# Patient Record
Sex: MALE | Race: WHITE | Employment: FULL TIME | URBAN - METROPOLITAN AREA
[De-identification: names, ages, dates, MRNs, and addresses within clinical notes are randomized per-mention and may not be internally consistent; named-entity substitution may affect disease eponyms.]

---

## 2020-06-14 ENCOUNTER — HOSPITAL ENCOUNTER (EMERGENCY)
Age: 50
Discharge: HOME | End: 2020-06-14
Attending: EMERGENCY MEDICINE | Admitting: EMERGENCY MEDICINE
Payer: COMMERCIAL

## 2020-06-14 VITALS
HEIGHT: 74 IN | RESPIRATION RATE: 18 BRPM | WEIGHT: 255 LBS | OXYGEN SATURATION: 97 % | BODY MASS INDEX: 32.73 KG/M2 | DIASTOLIC BLOOD PRESSURE: 96 MMHG | TEMPERATURE: 98.4 F | HEART RATE: 72 BPM | SYSTOLIC BLOOD PRESSURE: 162 MMHG

## 2020-06-14 DIAGNOSIS — H60.501 ACUTE OTITIS EXTERNA OF RIGHT EAR, UNSPECIFIED TYPE: Primary | ICD-10-CM

## 2020-06-14 PROCEDURE — 74011000250 HC RX REV CODE- 250: Performed by: PHYSICIAN ASSISTANT

## 2020-06-14 PROCEDURE — 99283 EMERGENCY DEPT VISIT LOW MDM: CPT

## 2020-06-14 RX ORDER — CIPROFLOXACIN HYDROCHLORIDE 3.5 MG/ML
4 SOLUTION/ DROPS TOPICAL 2 TIMES DAILY
Qty: 5 ML | Refills: 0 | Status: SHIPPED | OUTPATIENT
Start: 2020-06-14 | End: 2020-06-21

## 2020-06-14 RX ORDER — PREDNISONE 50 MG/1
50 TABLET ORAL DAILY
Qty: 3 TAB | Refills: 0 | Status: SHIPPED | OUTPATIENT
Start: 2020-06-14 | End: 2020-06-17

## 2020-06-14 RX ORDER — CIPROFLOXACIN HYDROCHLORIDE 3.5 MG/ML
4 SOLUTION/ DROPS TOPICAL ONCE
Status: COMPLETED | OUTPATIENT
Start: 2020-06-14 | End: 2020-06-14

## 2020-06-14 RX ADMIN — CIPROFLOXACIN 4 DROP: 3 SOLUTION OPHTHALMIC at 13:20

## 2020-06-14 NOTE — ED TRIAGE NOTES
Patient reports right ear pain 2 weeks ago after wearing ear plugs. Patient reports pain subsided, but returned again 5 days ago.

## 2020-06-14 NOTE — ED PROVIDER NOTES
Date of Service:  6/14/2020    Patient:  Shaw Alvarenga    Chief Complaint:  Ear Pain       HPI:  Shaw Alvarenga is a 48 y.o. male who presents for evaluation of right ear pain, swelling, discharge x 5 days after using ear plugs. No fever, no recent URI. No congestion, sore throat, chest pain, cough, abdominal pain, n/v/d. No DM. Past Medical History:   Diagnosis Date    Allergies     Asthma     GERD (gastroesophageal reflux disease)     Hypertension     Hypothyroid        Past Surgical History:   Procedure Laterality Date    HX TONSILLECTOMY           History reviewed. No pertinent family history.     Social History     Socioeconomic History    Marital status: SINGLE     Spouse name: Not on file    Number of children: Not on file    Years of education: Not on file    Highest education level: Not on file   Occupational History    Not on file   Social Needs    Financial resource strain: Not on file    Food insecurity     Worry: Not on file     Inability: Not on file    Transportation needs     Medical: Not on file     Non-medical: Not on file   Tobacco Use    Smoking status: Never Smoker    Smokeless tobacco: Never Used   Substance and Sexual Activity    Alcohol use: Not on file    Drug use: Not on file    Sexual activity: Not on file   Lifestyle    Physical activity     Days per week: Not on file     Minutes per session: Not on file    Stress: Not on file   Relationships    Social connections     Talks on phone: Not on file     Gets together: Not on file     Attends L[employer] day service: Not on file     Active member of club or organization: Not on file     Attends meetings of clubs or organizations: Not on file     Relationship status: Not on file    Intimate partner violence     Fear of current or ex partner: Not on file     Emotionally abused: Not on file     Physically abused: Not on file     Forced sexual activity: Not on file   Other Topics Concern    Not on file   Social History Narrative    Not on file         ALLERGIES: Aspirin    Review of Systems   Constitutional: Negative for chills and fever. HENT: Positive for ear discharge and ear pain. Negative for congestion, facial swelling and sore throat. Eyes: Negative for pain. Respiratory: Negative for cough and shortness of breath. Cardiovascular: Negative for chest pain and palpitations. Gastrointestinal: Negative for abdominal pain, diarrhea, nausea and vomiting. Genitourinary: Negative for dysuria, frequency and urgency. Musculoskeletal: Negative for back pain and neck pain. Neurological: Negative for dizziness, light-headedness and headaches. Vitals:    06/14/20 1145   BP: (!) 162/96   Pulse: 72   Resp: 18   Temp: 98.4 °F (36.9 °C)   SpO2: 97%   Weight: 115.7 kg (255 lb)   Height: 6' 2\" (1.88 m)            Physical Exam  Vitals signs and nursing note reviewed. Constitutional:       Appearance: Normal appearance. HENT:      Head: Normocephalic and atraumatic. Right Ear: External ear normal. Drainage, swelling and tenderness present. No mastoid tenderness. Left Ear: External ear normal. No mastoid tenderness. Ears:      Comments: Right canal edema, erythema, TTP, minimal discharge noted     Nose: Nose normal.   Eyes:      Extraocular Movements: Extraocular movements intact. Conjunctiva/sclera: Conjunctivae normal.      Pupils: Pupils are equal, round, and reactive to light. Neck:      Musculoskeletal: Normal range of motion and neck supple. Cardiovascular:      Rate and Rhythm: Normal rate and regular rhythm. Pulses: Normal pulses. Heart sounds: Normal heart sounds. Pulmonary:      Effort: Pulmonary effort is normal.      Breath sounds: Normal breath sounds. Abdominal:      General: Abdomen is flat. Bowel sounds are normal.      Palpations: Abdomen is soft. Musculoskeletal: Normal range of motion.    Lymphadenopathy:      Cervical:      Right cervical: No superficial, deep or posterior cervical adenopathy. Left cervical: No superficial, deep or posterior cervical adenopathy. Skin:     General: Skin is warm and dry. Neurological:      General: No focal deficit present. Mental Status: He is alert and oriented to person, place, and time. Mental status is at baseline. Psychiatric:         Mood and Affect: Mood normal.         Behavior: Behavior normal.         Thought Content: Thought content normal.          MDM  Number of Diagnoses or Management Options  Acute otitis externa of right ear, unspecified type:   Diagnosis management comments: Medications During Visit:  Medications  ciprofloxacin HCl (CILOXAN) 0.3 % ophthalmic solution 4 Drop (4 Drops Right Ear Given 6/14/20 1320)      DECISION MAKING:  Roosevelt Simpson is a 48 y.o. male who comes in as above. Right ear pain swelling, discharge after placing ear plugs. No fever/chills, URI symptoms. No mastoid tenderness. Swelling and erythema localized to ear canal, no lymphadenopathy. Ear wick placed, Ciprofloxacin drops applied. Patient discharged with clear instructions and printed prescription. Discussed need for follow up with PCP within 1 week. Return to ED if any worsening symptoms or additional concerns. IMPRESSION:  Acute otitis externa of right ear, unspecified type  (primary encounter diagnosis)    DISPOSITION:  Discharged      Discharge Medication List as of 6/14/2020  1:19 PM    START taking these medications    ciprofloxacin HCl (CILOXAN) 0.3 % ophthalmic solution  4 Drops by Otic route two (2) times a day for 7 days. , Print, Disp-5 mL, R-0     predniSONE (DELTASONE) 50 mg tablet  Take 1 Tab by mouth daily for 3 days. , Print, Disp-3 Tab, R-0           Follow-up Information     Follow up With Specialties Details Why Contact Deepali Roberts MD Family Practice Schedule an appointment as soon as   possible for a visit in 1 week As needed [phone] [facility] [address] [place]      [address] Emergency Medicine Go to  If symptoms worsen [address] [facility]  [phone]          The patient is asked to follow-up with their primary care provider in the next several days. They are to call tomorrow for an appointment. The patient is asked to return promptly for any increased concerns or worsening of symptoms. They can return to this emergency department or any other emergency department. Procedures    Discussed placement of ear wick and medication with patient.  Ear wick placed by, 4 drops ciprofloxacin ophthalmic applied, patient tolerated well and without complication